# Patient Record
Sex: MALE | Race: BLACK OR AFRICAN AMERICAN
[De-identification: names, ages, dates, MRNs, and addresses within clinical notes are randomized per-mention and may not be internally consistent; named-entity substitution may affect disease eponyms.]

---

## 2020-09-02 ENCOUNTER — HOSPITAL ENCOUNTER (OUTPATIENT)
Dept: HOSPITAL 94 - PAS | Age: 60
Discharge: HOME | End: 2020-09-02
Attending: ORTHOPAEDIC SURGERY
Payer: COMMERCIAL

## 2020-09-02 VITALS — SYSTOLIC BLOOD PRESSURE: 136 MMHG | DIASTOLIC BLOOD PRESSURE: 87 MMHG

## 2020-09-02 VITALS — DIASTOLIC BLOOD PRESSURE: 85 MMHG | SYSTOLIC BLOOD PRESSURE: 126 MMHG

## 2020-09-02 VITALS — DIASTOLIC BLOOD PRESSURE: 77 MMHG | SYSTOLIC BLOOD PRESSURE: 127 MMHG

## 2020-09-02 VITALS — HEIGHT: 70 IN | WEIGHT: 279.33 LBS | BODY MASS INDEX: 39.99 KG/M2

## 2020-09-02 VITALS — DIASTOLIC BLOOD PRESSURE: 93 MMHG | SYSTOLIC BLOOD PRESSURE: 140 MMHG

## 2020-09-02 VITALS — SYSTOLIC BLOOD PRESSURE: 122 MMHG | DIASTOLIC BLOOD PRESSURE: 77 MMHG

## 2020-09-02 VITALS — SYSTOLIC BLOOD PRESSURE: 126 MMHG | DIASTOLIC BLOOD PRESSURE: 85 MMHG

## 2020-09-02 DIAGNOSIS — Z87.891: ICD-10-CM
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
## 2020-09-02 DIAGNOSIS — Z98.890: ICD-10-CM
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
## 2020-09-02 DIAGNOSIS — N18.9: ICD-10-CM

## 2020-09-02 DIAGNOSIS — M65.862: ICD-10-CM

## 2020-09-02 DIAGNOSIS — S83.242A: Primary | ICD-10-CM
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
## 2020-09-02 DIAGNOSIS — I12.9: ICD-10-CM
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
## 2020-09-02 DIAGNOSIS — Z79.899: ICD-10-CM

## 2020-09-02 PROCEDURE — 82948 REAGENT STRIP/BLOOD GLUCOSE: CPT

## 2020-09-02 PROCEDURE — 29881 ARTHRS KNE SRG MNISECTMY M/L: CPT

## 2020-09-02 NOTE — NUR
PATIENT AND FAMILY AND THEY HAVE VERBALIZED UNDERSTANDING, OPPORTUNITY TO ASK QUESTIONS 
GIVEN AND PATIENT COMFORTABLE WITH DC. IV TAKEN OUT WITHOUT COMPLICATION. PATIENT HAS MET 
ALL DC CRITERIA FOR DC HOME.  I HAVE REVIEWED D/C INSTRUCTIONS WITH OUT VIA WHEELCHAIR WHERE 
PATIENT WAS TAKEN HOME WITH ALL BELONGINGS. [facility] GAVE PATIENT TRANSPORT HOME. PATIENT 
MANAGED TO GET INTO TRANSPORT BACK TO FACILITY.


-------------------------------------------------------------------------------

Addendum: 09/02/20 at 1856 by Dawit Solo RN, RN

-------------------------------------------------------------------------------

Amended: Links added.